# Patient Record
Sex: FEMALE | Race: WHITE | HISPANIC OR LATINO
[De-identification: names, ages, dates, MRNs, and addresses within clinical notes are randomized per-mention and may not be internally consistent; named-entity substitution may affect disease eponyms.]

---

## 2021-05-21 ENCOUNTER — APPOINTMENT (OUTPATIENT)
Dept: FAMILY MEDICINE | Facility: CLINIC | Age: 51
End: 2021-05-21

## 2021-11-08 ENCOUNTER — NON-APPOINTMENT (OUTPATIENT)
Age: 51
End: 2021-11-08

## 2021-11-11 ENCOUNTER — APPOINTMENT (OUTPATIENT)
Dept: RHEUMATOLOGY | Facility: CLINIC | Age: 51
End: 2021-11-11
Payer: COMMERCIAL

## 2021-11-11 ENCOUNTER — NON-APPOINTMENT (OUTPATIENT)
Age: 51
End: 2021-11-11

## 2021-11-11 VITALS
RESPIRATION RATE: 15 BRPM | TEMPERATURE: 97.3 F | BODY MASS INDEX: 25.3 KG/M2 | SYSTOLIC BLOOD PRESSURE: 140 MMHG | HEIGHT: 61 IN | OXYGEN SATURATION: 99 % | WEIGHT: 134 LBS | DIASTOLIC BLOOD PRESSURE: 80 MMHG | HEART RATE: 61 BPM

## 2021-11-11 DIAGNOSIS — R21 RASH AND OTHER NONSPECIFIC SKIN ERUPTION: ICD-10-CM

## 2021-11-11 DIAGNOSIS — M79.7 FIBROMYALGIA: ICD-10-CM

## 2021-11-11 DIAGNOSIS — M25.50 PAIN IN UNSPECIFIED JOINT: ICD-10-CM

## 2021-11-11 PROCEDURE — 99204 OFFICE O/P NEW MOD 45 MIN: CPT

## 2021-11-11 RX ORDER — TIZANIDINE 2 MG/1
2 TABLET ORAL
Qty: 60 | Refills: 2 | Status: ACTIVE | COMMUNITY
Start: 2021-11-11 | End: 1900-01-01

## 2021-11-11 NOTE — PHYSICAL EXAM
[General Appearance - Alert] : alert [General Appearance - In No Acute Distress] : in no acute distress [General Appearance - Well Nourished] : well nourished [Sclera] : the sclera and conjunctiva were normal [PERRL With Normal Accommodation] : pupils were equal in size, round, and reactive to light [Extraocular Movements] : extraocular movements were intact [Oriented To Time, Place, And Person] : oriented to person, place, and time [Affect] : the affect was normal [Impaired Insight] : insight and judgment were intact [Outer Ear] : the ears and nose were normal in appearance [Nasal Cavity] : the nasal mucosa and septum were normal [Oropharynx] : the oropharynx was normal [Neck Appearance] : the appearance of the neck was normal [Thyroid Diffuse Enlargement] : the thyroid was not enlarged [Auscultation Breath Sounds / Voice Sounds] : lungs were clear to auscultation bilaterally [Heart Rate And Rhythm] : heart rate was normal and rhythm regular [Heart Sounds] : normal S1 and S2 [Murmurs] : no murmurs [Edema] : there was no peripheral edema [Bowel Sounds] : normal bowel sounds [Abdomen Soft] : soft [] : no hepato-splenomegaly [Abdomen Mass (___ Cm)] : no abdominal mass palpated [Cervical Lymph Nodes Enlarged Anterior Bilaterally] : anterior cervical [Supraclavicular Lymph Nodes Enlarged Bilaterally] : supraclavicular [No CVA Tenderness] : no ~M costovertebral angle tenderness [No Spinal Tenderness] : no spinal tenderness [Abnormal Walk] : normal gait [Nail Clubbing] : no clubbing  or cyanosis of the fingernails [Motor Tone] : muscle strength and tone were normal [Musculoskeletal - Swelling] : no joint swelling seen [Skin Color & Pigmentation] : normal skin color and pigmentation [FreeTextEntry1] : + rosacea but partially appears ?karyna  [Motor Exam] : the motor exam was normal [No Focal Deficits] : no focal deficits

## 2021-11-11 NOTE — CONSULT LETTER
[Dear  ___] : Dear  [unfilled], [Consult Letter:] : I had the pleasure of evaluating your patient, [unfilled]. [Please see my note below.] : Please see my note below. [Consult Closing:] : Thank you very much for allowing me to participate in the care of this patient.  If you have any questions, please do not hesitate to contact me. [Sincerely,] : Sincerely, [FreeTextEntry3] : Katelyn Valentine MD\par Rheumatology\par [facility] \par \par [address] [place] [address]\par P: [phone]\par F: [phone]\par

## 2021-11-11 NOTE — HISTORY OF PRESENT ILLNESS
[FreeTextEntry1] : ANA LILIA FRANCISCO is a 51 year old woman who presents with FMS previously dx, reports sx started after the birth of her daughter 23 years ago, worsened significant after episode of gallstone pancreatitis in 2012. Currently pain diffusely, worst areas as mid-low back, b/l GTB/ITB, abdominal area. More severe prior to menses. Worse with weather changes. Poor sleep, daytime fatigue, taking melatonin or muscle relaxer PRN which often helps but some nights its doesn't. Hx of mild depressive sx, has not responded to meds in the past, actually made her worse. + Mild skin hypersensitivity, + severe sx if over exercises or overexerts, improved with light exercise. + IBS. Currently avoiding gluten, diary, red meat but has not helped. EGD/colon reportedly normal. Failed Cymbalta and Lyrica in the past as they made sx worse. \par \par Inflammatory arthritis ROS negative for symmetrical peripheral joint synovitis, prolonged AM stiffness, enthesitis, dactylitis, psoriasis/ rashes, eye inflammation, inflammatory low back pain, IBD. \par \par + facial rash, dx as rosacea. SLE ROS negative for alopecia, sicca, salivary gland swelling, oral ulcers, malar rash, photosensitivity, SOB, chest pain, serositis, abd pain, dysuria, hematuria, rash, joint AM stiffness/synovitis, hematologic abnormalities, Raynauds. APLS ROS - 2 uncomplicated pregnancies, no miscarriage, no thrombotic events.

## 2021-11-11 NOTE — ASSESSMENT
[FreeTextEntry1] : ANA LILIA FRANCISCO is a 51 year old woman who presents with chronic diffuse myalgias, arthralgias, poor sleep, fatigue, exercise intolerance, soft tissue tender points, skin hyperesthesia -- hx and exam most consistent with FMS. Rash on face raises ? inflammatory concerns. Poor response to meds in the past. \par \par - discussed that clinically I agree with prior FMS dx but would like to check serologies as FMS is a dx of exclusion -- check for other etiologies of sx as below\par - c/w melatonin prn QHS for sleep\par - will trial low dose zanaflex as she has had benefit from this in past -- Advised pt of sedation and advised to take when does not have to drive the following day to evaluate extent of sedation. Can reduce to 1/2 dose QHS if getting benefit but too much somnolence. Pt verbalized understanding. \par - Reviewed stretching, light aerobic exercises, massage, heat as adjuncts for FMS pain. Discussed nonpharmacologic FMS therapies as well - encouraged to incorporate small activities that she finds beneficial during her day, optimize stretching and light exercise, and be mindful of mood, be aware not to overextend on days she has less pain, ensure adequate rest between strenuous activities. Patient verbalized understanding. \par - PT referral \par - reviewed possible med mgmt options -- savella, gabapentin, elavil, wellbutrin if above not helpful

## 2021-11-11 NOTE — REVIEW OF SYSTEMS
[Negative] : Heme/Lymph [Fever] : no fever [Chills] : no chills [Feeling Poorly] : feeling poorly [Feeling Tired] : feeling tired [Abdominal Pain] : abdominal pain [Arthralgias] : arthralgias [Joint Pain] : joint pain [Joint Swelling] : no joint swelling [Joint Stiffness] : no joint stiffness [As Noted in HPI] : as noted in HPI [Suicidal] : not suicidal [Sleep Disturbances] : sleep disturbances

## 2022-02-04 ENCOUNTER — APPOINTMENT (OUTPATIENT)
Dept: RHEUMATOLOGY | Facility: CLINIC | Age: 52
End: 2022-02-04